# Patient Record
Sex: FEMALE | Race: WHITE
[De-identification: names, ages, dates, MRNs, and addresses within clinical notes are randomized per-mention and may not be internally consistent; named-entity substitution may affect disease eponyms.]

---

## 2017-04-16 ENCOUNTER — HOSPITAL ENCOUNTER (EMERGENCY)
Dept: HOSPITAL 74 - JER | Age: 37
Discharge: HOME | End: 2017-04-16
Payer: SELF-PAY

## 2017-04-16 VITALS — SYSTOLIC BLOOD PRESSURE: 104 MMHG | HEART RATE: 76 BPM | DIASTOLIC BLOOD PRESSURE: 62 MMHG

## 2017-04-16 VITALS — TEMPERATURE: 98.6 F

## 2017-04-16 VITALS — BODY MASS INDEX: 32.6 KG/M2

## 2017-04-16 DIAGNOSIS — R06.02: ICD-10-CM

## 2017-04-16 DIAGNOSIS — R00.2: Primary | ICD-10-CM

## 2017-04-16 LAB
ALBUMIN SERPL-MCNC: 3.9 G/DL (ref 3.4–5)
ALP SERPL-CCNC: 88 U/L (ref 45–117)
ALT SERPL-CCNC: 46 U/L (ref 12–78)
ANION GAP SERPL CALC-SCNC: 9 MMOL/L (ref 8–16)
APPEARANCE UR: (no result)
AST SERPL-CCNC: 21 U/L (ref 15–37)
BASOPHILS # BLD: 0.6 % (ref 0–2)
BILIRUB SERPL-MCNC: 0.3 MG/DL (ref 0.2–1)
BILIRUB UR STRIP.AUTO-MCNC: NEGATIVE MG/DL
CALCIUM SERPL-MCNC: 9 MG/DL (ref 8.5–10.1)
CO2 SERPL-SCNC: 27 MMOL/L (ref 21–32)
COLOR UR: (no result)
CREAT SERPL-MCNC: 0.8 MG/DL (ref 0.55–1.02)
D DIMER PPP FEU-MCNC: 301 NG/ML
DEPRECATED RDW RBC AUTO: 13.1 % (ref 11.6–15.6)
EOSINOPHIL # BLD: 3.4 % (ref 0–4.5)
GLUCOSE SERPL-MCNC: 98 MG/DL (ref 74–106)
INR BLD: 1.02 (ref 0.82–1.09)
KETONES UR QL STRIP: NEGATIVE
LEUKOCYTE ESTERASE UR QL STRIP.AUTO: (no result)
MCH RBC QN AUTO: 30 PG (ref 25.7–33.7)
MCHC RBC AUTO-ENTMCNC: 34.5 G/DL (ref 32–36)
MCV RBC: 87 FL (ref 80–96)
MUCOUS THREADS URNS QL MICRO: (no result)
NEUTROPHILS # BLD: 60.6 % (ref 42.8–82.8)
NITRITE UR QL STRIP: NEGATIVE
PH UR: 5 [PH] (ref 5–8)
PLATELET # BLD AUTO: 302 K/MM3 (ref 134–434)
PMV BLD: 7.8 FL (ref 7.5–11.1)
PROT SERPL-MCNC: 7.6 G/DL (ref 6.4–8.2)
PROT UR QL STRIP: (no result)
PROT UR QL STRIP: NEGATIVE
PT PNL PPP: 11.2 SEC (ref 9.98–11.88)
RBC # BLD AUTO: 5493 /HPF (ref 0–3)
RBC # UR STRIP: (no result) /UL
SP GR UR: 1.02 (ref 1–1.03)
TROPONIN I SERPL-MCNC: < 0.02 NG/ML (ref 0–0.05)
UROBILINOGEN UR STRIP-MCNC: NEGATIVE E.U./DL (ref 0.2–1)
WBC # BLD AUTO: 10.3 K/MM3 (ref 4–10)
WBC # UR AUTO: (no result) /HPF (ref 3–5)

## 2017-04-16 NOTE — PDOC
History of Present Illness





<Shar Frost - Last Filed: 04/16/17 14:11>





- General


History Source: Patient


Exam Limitations: No Limitations





- History of Present Illness


Initial Comments: 


04/16/17 12:07


The patient is a 36 year old female, LMP now, with no significant past medical 

history, who presents to the ER with intermittent chest pain, heart palpitations

, and shortness of breath for two months. Patient describes the chest pain as a 

squeezing sensation, localized to the mid chest region. She attributed her 

symptoms to anxiety after the passing of her father one month ago. Patient says 

the symptoms come and go at unexpected times, but improve when she is busy 

doing housework. Patient reports coming to the ER today because the chest pain 

and shortness of breath were worse last night and she was not able to sleep 

well. She also says she had a day off today and was able to come to the ER. 

Patient denies going to a PCP or cardiologist for her symptoms. 





Denies alcohol use or smoking


Denies nausea, vomiting


Denies lightheadedness, diaphoresis





FHx: Father passed away 1 month ago due to lung cancer s/p chain smoking








<Lorne,Maribell - Last Filed: 04/16/17 14:32>





- General


Chief Complaint: Chest Pain


Stated Complaint: CHEST PAIN


Time Seen by Provider: 04/16/17 11:36





Past History





- Past Medical History


Other medical history: NONE





- Psycho/Social/Smoking Cessation Hx


Anxiety: No


Suicidal Ideation: No


Smoking History: Never smoked


Hx Alcohol Use: No


Drug/Substance Use Hx: No


Substance Use Type: None





<Shar Frost - Last Filed: 04/16/17 14:11>





<Maribell Pradhan - Last Filed: 04/16/17 14:32>





- Past Medical History


Allergies/Adverse Reactions: 


 Allergies











Allergy/AdvReac Type Severity Reaction Status Date / Time


 


No Known Allergies Allergy   Verified 04/16/17 11:25











Home Medications: 


Ambulatory Orders





NK [No Known Home Medication]  04/16/17 











**Review of Systems





- Review of Systems


Able to Perform ROS?: Yes


Comments:: 





04/16/17 12:09


GENERAL/CONSTITUTIONAL: No fever or chills. No weakness.


HEAD, EYES, EARS, NOSE AND THROAT: No change in vision. No ear pain or 

discharge. No sore throat.


CARDIOVASCULAR: Chest pain, SOB, heart palpitations


RESPIRATORY: No cough, wheezing, or hemoptysis.


GASTROINTESTINAL: No nausea, vomiting, diarrhea or constipation.


GENITOURINARY: No dysuria, frequency, or change in urination.


MUSCULOSKELETAL: No joint or muscle swelling or pain. No neck or back pain.


SKIN: No rash


NEUROLOGIC: No headache, vertigo, loss of consciousness, or change in strength/

sensation.


ENDOCRINE: No increased thirst. No abnormal weight change.


HEMATOLOGIC/LYMPHATIC: No anemia, easy bleeding, or history of blood clots.


ALLERGIC/IMMUNOLOGIC: No hives or skin allergy.





<Uts,Maribell - Last Filed: 04/16/17 14:32>





*Physical Exam





- Vital Signs


 Last Vital Signs











Temp Pulse Resp BP Pulse Ox


 


 98.6 F   84   20   120/73   99 


 


 04/16/17 11:22  04/16/17 11:22  04/16/17 11:22  04/16/17 11:22  04/16/17 11:22














<Shar Frost - Last Filed: 04/16/17 14:11>





- Vital Signs


 Last Vital Signs











Temp Pulse Resp BP Pulse Ox


 


 98.6 F   84   20   120/73   99 


 


 04/16/17 11:22  04/16/17 11:22  04/16/17 11:22  04/16/17 11:22  04/16/17 11:22














- Physical Exam


Comments: 





04/16/17 12:10


GENERAL: Awake, alert, and fully oriented, in no acute distress


HEAD: No signs of trauma


EYES: PERRLA, EOMI, sclera anicteric, conjunctiva clear


ENT: Auricles normal inspection, hearing grossly normal, nares patent, 

oropharynx clear without exudates. Moist mucosa


NECK: Normal ROM, supple, no lymphadenopathy, JVD, or masses


LUNGS: Breath sounds equal, clear to auscultation bilaterally.  No wheezes, and 

no crackles


HEART: Regular rate and rhythm, normal S1 and S2, no murmurs, rubs or gallops


ABDOMEN: Soft, nontender, normoactive bowel sounds.  No guarding, no rebound.  

No masses


EXTREMITIES: Normal range of motion, no edema.  No clubbing or cyanosis. No 

cords, erythema, or tenderness


NEUROLOGICAL: Cranial nerves II through XII grossly intact.  Normal speech, 

normal gait


SKIN: Warm, Dry, normal turgor, no rashes or lesions noted.








<Maribell Pradhan - Last Filed: 04/16/17 14:32>





ED Treatment Course





- LABORATORY


CBC & Chemistry Diagram: 


 04/16/17 11:46





 04/16/17 11:46





<Shar Frost - Last Filed: 04/16/17 14:11>





- LABORATORY


CBC & Chemistry Diagram: 


 04/16/17 11:46





 04/16/17 11:46





- RADIOLOGY


Radiograph Interpretation: 





04/16/17 14:31


Chest XR impression reported by Dr. Nolberto Rosen: No evidence of acute 

pulmonary emboli 





<Maribell Pradhan - Last Filed: 04/16/17 14:32>





Medical Decision Making





- Medical Decision Making


04/16/17 12:11


The patient is a 36 year old female who presents to the ER with SOB, chest pain

, and heart palpitations for a couple of months. 





Will check labs and chest CT 


Then reassess











<Maribell Pradhan - Last Filed: 04/16/17 14:32>





*DC/Admit/Observation/Transfer





- Discharge Dispostion


Admit: No





- Attestations


Physician Attestion: 





04/16/17 11:36








I, Dr. Shar Frost, attest that this document has been prepared under my 

direction and personally reviewed by me in its entirety.   I further attest, 

that it accurately reflects all work, treatment, procedures and medical decision

-making performed by me.  





<Shar Frost - Last Filed: 04/16/17 14:11>





- Attestations


Scribe Attestion: 





04/16/17 12:15





Documentation prepared by Maribell Pradhan, acting as medical scribe for Shar Frost DO.





<Maribell Pradhan - Last Filed: 04/16/17 14:32>


Diagnosis at time of Disposition: 


 Intermittent palpitations





Dyspnea


Qualifiers:


 Dyspnea type: shortness of breath Qualified Code(s): R06.02 - Shortness of 

breath





- Discharge Dispostion


Disposition: HOME


Condition at time of disposition: Good





- Referrals


Referrals: 


Zheng Dumont MD [Staff Physician] - 





- Patient Instructions


Printed Discharge Instructions:  DI for Atypical Chest Pain


Additional Instructions: 


May-  





Please follow up with the cardiologist as soon as possible and return to us if 

you get worse or develop any new symptoms.  No medicines for now.  I am sorry 

about your dad.





Best-


Dr. Shar Frost

## 2018-12-30 ENCOUNTER — HOSPITAL ENCOUNTER (EMERGENCY)
Dept: HOSPITAL 74 - JERFT | Age: 38
Discharge: HOME | End: 2018-12-30
Payer: COMMERCIAL

## 2018-12-30 VITALS — DIASTOLIC BLOOD PRESSURE: 73 MMHG | SYSTOLIC BLOOD PRESSURE: 125 MMHG | HEART RATE: 86 BPM | TEMPERATURE: 98.6 F

## 2018-12-30 VITALS — BODY MASS INDEX: 0.3 KG/M2
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
## 2018-12-30 DIAGNOSIS — R07.89: Primary | ICD-10-CM
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
## 2018-12-30 LAB
ALBUMIN SERPL-MCNC: 4 G/DL (ref 3.4–5)
ALP SERPL-CCNC: 84 U/L (ref 45–117)
ALT SERPL-CCNC: 34 U/L (ref 13–61)
ANION GAP SERPL CALC-SCNC: 9 MMOL/L (ref 8–16)
APTT BLD: 32.9 SECONDS (ref 25.2–36.5)
AST SERPL-CCNC: 30 U/L (ref 15–37)
BILIRUB SERPL-MCNC: 0.4 MG/DL (ref 0.2–1)
BUN SERPL-MCNC: 14 MG/DL (ref 7–18)
CALCIUM SERPL-MCNC: 9 MG/DL (ref 8.5–10.1)
CHLORIDE SERPL-SCNC: 105 MMOL/L (ref 98–107)
CO2 SERPL-SCNC: 23 MMOL/L (ref 21–32)
CREAT SERPL-MCNC: 0.8 MG/DL (ref 0.55–1.3)
DEPRECATED RDW RBC AUTO: 12.6 % (ref 11.6–15.6)
GLUCOSE SERPL-MCNC: 128 MG/DL (ref 74–106)
HCT VFR BLD CALC: 41.6 % (ref 32.4–45.2)
HGB BLD-MCNC: 13.9 GM/DL (ref 10.7–15.3)
INR BLD: 0.95 (ref 0.83–1.09)
MCH RBC QN AUTO: 29.2 PG (ref 25.7–33.7)
MCHC RBC AUTO-ENTMCNC: 33.3 G/DL (ref 32–36)
MCV RBC: 87.8 FL (ref 80–96)
PLATELET # BLD AUTO: 289 K/MM3 (ref 134–434)
PMV BLD: 7.8 FL (ref 7.5–11.1)
POTASSIUM SERPLBLD-SCNC: 4.4 MMOL/L (ref 3.5–5.1)
PROT SERPL-MCNC: 7.9 G/DL (ref 6.4–8.2)
PT PNL PPP: 11.2 SEC (ref 9.7–13)
RBC # BLD AUTO: 4.74 M/MM3 (ref 3.6–5.2)
SODIUM SERPL-SCNC: 137 MMOL/L (ref 136–145)
WBC # BLD AUTO: 12.7 K/MM3 (ref 4–10)

## 2018-12-30 PROCEDURE — 3E0333Z INTRODUCTION OF ANTI-INFLAMMATORY INTO PERIPHERAL VEIN, PERCUTANEOUS APPROACH: ICD-10-PCS

## 2018-12-30 RX ADMIN — ACETAMINOPHEN ONE MG: 325 TABLET ORAL at 17:12

## 2018-12-30 RX ADMIN — ACETAMINOPHEN ONE: 325 TABLET ORAL at 17:19

## 2018-12-30 NOTE — PDOC
History of Present Illness





- General


Chief Complaint: Chest Pain


Stated Complaint: CHEST PAIN


Time Seen by Provider: 12/30/18 16:50


History Source: Patient


Exam Limitations: No Limitations





- History of Present Illness


Initial Comments: 





12/30/18 17:00


38 yr female with no pmhx presents to ER with left sided chest pain started 

last night worse the past 2 hrs today. Pt denies nausea or vomiting, pain is 

non radiating. 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 

heavy. Pt took 1000mg acetaminophen PTA. no fever no cough. 


12/30/18 17:17





Presenting Symptoms: Chest Pain


Timing/Duration: reports: constant, getting worse


Severity/Quality: reports: moderate, sharp, stabbing





Past History





- Past Medical History


Allergies/Adverse Reactions: 


 Allergies











Allergy/AdvReac Type Severity Reaction Status Date / Time


 


No Known Allergies Allergy   Verified 12/30/18 16:36











Home Medications: 


Ambulatory Orders





Naproxen [Naprosyn -] 500 mg PO BID PRN #14 tablet 12/30/18 








COPD: No





- Suicide/Smoking/Psychosocial Hx


Smoking History: Never smoked


Hx Alcohol Use: No


Drug/Substance Use Hx: No


Substance Use Type: None





Cardiac Specific PMH





- Complaint Specific PMHX


Abdominal Aortic Aneurysm: No


Angina: No


Cardiac Arrhythmia: No


Cardiac Stent: No


GERD: No


Myocardial Infarction: No


Pacemaker: No


Pulmonary Embolus: No


Valvular Heart Disease: No


Peripheral Vascular Disease: No





*Physical Exam





- Vital Signs


 Last Vital Signs











Temp Pulse Resp BP Pulse Ox


 


 98.6 F   86   16   125/73   99 


 


 12/30/18 16:37  12/30/18 16:37  12/30/18 16:37  12/30/18 16:37  12/30/18 16:37














- Physical Exam


General Appearance: Yes: Nourished, Appropriately Dressed


HEENT: positive: EOMI, SHIRA, Normal ENT Inspection, TMs Normal, Pharynx Normal


Neck: positive: Supple.  negative: Tender


Respiratory/Chest: positive: Lungs Clear, Normal Breath Sounds.  negative: 

Chest Tender, Crackles, Rales, Rhonchi, Stridor, Wheezing


Cardiovascular: positive: Regular Rhythm, Regular Rate


Gastrointestinal/Abdominal: positive: Normal Bowel Sounds, Soft


Lymphatic: negative: Adenopathy


Musculoskeletal: positive: Normal Inspection


Extremity: positive: Normal Capillary Refill, Normal Inspection, Normal Range 

of Motion


Integumentary: positive: Normal Color, Dry, Warm


Neurologic: positive: Fully Oriented, Alert, Normal Mood/Affect, Normal Response

, Motor Strength 5/5, Finger to Nose (intact).  negative: Numbness, Sensory 

Deficit





**Heart Score/ECG Review





- ECG Impressions


Normal ECG: Yes


Non-specific ST Elevation: No


Ischemic Changes: No


Comment:: 





12/30/18 17:05


signed by  ER 





Moderate Sedation





- Procedure Monitoring


Vital Signs: 


Procedure Monitoring Vital Signs











Temperature  98.6 F   12/30/18 16:37


 


Pulse Rate  86   12/30/18 16:37


 


Respiratory Rate  16   12/30/18 16:37


 


Blood Pressure  125/73   12/30/18 16:37


 


O2 Sat by Pulse Oximetry (%)  99   12/30/18 16:37











ED Treatment Course





- LABORATORY


CBC & Chemistry Diagram: 


 12/30/18 17:00





 12/30/18 17:00





- ADDITIONAL ORDERS


Additional order review: 


 Laboratory  Results











  12/30/18 12/30/18 12/30/18





  17:00 17:00 17:00


 


PT with INR    Cancelled


 


INR    Cancelled


 


PTT (Actin FS)    Cancelled


 


D-Dimer  Cancelled  


 


Sodium   137 


 


Potassium   4.4 


 


Chloride   105 


 


Carbon Dioxide   23 


 


Anion Gap   9 


 


BUN   14 


 


Creatinine   0.8 


 


Creat Clearance w eGFR   > 60 


 


Random Glucose   128 H 


 


Calcium   9.0 


 


Total Bilirubin   0.4 


 


AST   30 


 


ALT   34 


 


Alkaline Phosphatase   84 


 


Creatine Kinase   131 


 


Troponin I   < 0.02 


 


Total Protein   7.9 


 


Albumin   4.0 


 


Urine HCG, Qual   














  12/30/18





  16:48


 


PT with INR 


 


INR 


 


PTT (Actin FS) 


 


D-Dimer 


 


Sodium 


 


Potassium 


 


Chloride 


 


Carbon Dioxide 


 


Anion Gap 


 


BUN 


 


Creatinine 


 


Creat Clearance w eGFR 


 


Random Glucose 


 


Calcium 


 


Total Bilirubin 


 


AST 


 


ALT 


 


Alkaline Phosphatase 


 


Creatine Kinase 


 


Troponin I 


 


Total Protein 


 


Albumin 


 


Urine HCG, Qual  Negative








 











  12/30/18





  17:00


 


RBC  4.74


 


MCV  87.8


 


MCHC  33.3


 


RDW  12.6


 


MPV  7.8














- RADIOLOGY


Radiology Studies Ordered: 














 Category Date Time Status


 


 CHEST PA & LAT [RAD] Stat Radiology  12/30/18 17:48 Taken














- Medications


Given in the ED: 


ED Medications














Discontinued Medications














Generic Name Dose Route Start Last Admin





  Trade Name Freq  PRN Reason Stop Dose Admin


 


Acetaminophen  650 mg  12/30/18 17:06  12/30/18 17:19





  Tylenol -  PO  12/30/18 17:07  Not Given





  ONCE ONE   





     





     





     





     


 


Ketorolac Tromethamine  30 mg  12/30/18 17:09  12/30/18 17:19





  Toradol Injection -  IVPUSH  12/30/18 17:10  30 mg





  ONCE ONE   Administration





     





     





     





     














Medical Decision Making





- Medical Decision Making





12/30/18 17:11


cc: lscp started last night is constant worse today, took 1000mg acetaminophen 

pta. 


pain is reproducable with movement 


non tender to palpation, no rashes no sob or nausea. 


pt believes it may be related to lifting heavy boxes at work


will check labs, EKG, pain meds 


r/o ACS, PE, chest wall pain


vitals stable





EKG is NSR no ectopy no tachycardia 


12/30/18 17:17





12/30/18 18:06


pt improved after the toradol states pain is 1/10 


labs are WNL EKG NSR


CX is negative


dc home with naprosyn follow up with clinic tomorrow





*DC/Admit/Observation/Transfer


Diagnosis at time of Disposition: 


 Musculoskeletal chest pain








- Discharge Dispostion


Disposition: HOME


Condition at time of disposition: Good





- Prescriptions


Prescriptions: 


Naproxen [Naprosyn -] 500 mg PO BID PRN #14 tablet


 PRN Reason: Pain





- Referrals


Referrals: 


[facility] [place] [Provider Group]


[facility] [Provider Group]





- Patient Instructions


Additional Instructions: 


take naprosyn for pain as directed


apply heating pad to the area of pain every 4hrs for 30 minutes while awake


avoid heavy lifting or bending 


follow up at the clinic tomorrow or tuesday for follow up call the number below 

tell them you were seen in the Emergency department for chest pain and you need 

a follow up visit. 


Return to ER for any worsening symptoms 





pelon naprosyn para el dolor segn las indicaciones


aplique raz almohadilla trmica al pedro pablo del dolor cada 4 horas winston 30 

minutos mientras est despierto


evitar levantar objetos pesados o doblarse


maegan un seguimiento en la clnica maana o lul para el seguimiento. Llame al 

nmero que se encuentra a continuacin para informarles que lo atendieron en el 

Departamento de Emergencias por dolor de pecho y que necesita raz visita de 

seguimiento.


Volver a la taryn de emergencias por cualquier empeoramiento de los sntomas





- Post Discharge Activity


Forms/Work/School Notes:  Back to Work

## 2018-12-31 NOTE — EKG
Test Reason : 

Blood Pressure : ***/*** mmHG

Vent. Rate : 087 BPM     Atrial Rate : 087 BPM

   P-R Int : 156 ms          QRS Dur : 082 ms

    QT Int : 382 ms       P-R-T Axes : 056 055 044 degrees

   QTc Int : 459 ms

 

NORMAL SINUS RHYTHM

NORMAL ECG

WHEN COMPARED WITH ECG OF 16-APR-2017 11:29,

NO SIGNIFICANT CHANGE WAS FOUND

Confirmed by NAS FRIAS MD ([id number]) on 12/31/2018 12:18:15 PM

 

Referred By:             Confirmed By:NAS FRIAS MD

## 2021-08-26 ENCOUNTER — HOSPITAL ENCOUNTER (EMERGENCY)
Dept: HOSPITAL 74 - JER | Age: 41
LOS: 1 days | Discharge: HOME | End: 2021-08-27
Payer: COMMERCIAL

## 2021-08-26 VITALS — BODY MASS INDEX: 34 KG/M2

## 2021-08-26 DIAGNOSIS — R10.9: Primary | ICD-10-CM

## 2021-08-26 PROCEDURE — U0005 INFEC AGEN DETEC AMPLI PROBE: HCPCS

## 2021-08-26 PROCEDURE — C9803 HOPD COVID-19 SPEC COLLECT: HCPCS

## 2021-08-26 PROCEDURE — U0003 INFECTIOUS AGENT DETECTION BY NUCLEIC ACID (DNA OR RNA); SEVERE ACUTE RESPIRATORY SYNDROME CORONAVIRUS 2 (SARS-COV-2) (CORONAVIRUS DISEASE [COVID-19]), AMPLIFIED PROBE TECHNIQUE, MAKING USE OF HIGH THROUGHPUT TECHNOLOGIES AS DESCRIBED BY CMS-2020-01-R: HCPCS

## 2021-08-27 VITALS — SYSTOLIC BLOOD PRESSURE: 146 MMHG | DIASTOLIC BLOOD PRESSURE: 88 MMHG

## 2021-08-27 VITALS — TEMPERATURE: 98.5 F | HEART RATE: 85 BPM

## 2021-08-27 LAB
ALBUMIN SERPL-MCNC: 3.6 G/DL (ref 3.4–5)
ALBUMIN SERPL-MCNC: 4.1 G/DL (ref 3.4–5)
ALP SERPL-CCNC: 73 U/L (ref 45–117)
ALP SERPL-CCNC: 83 U/L (ref 45–117)
ALT SERPL-CCNC: 172 U/L (ref 13–61)
ALT SERPL-CCNC: 210 U/L (ref 13–61)
ANION GAP SERPL CALC-SCNC: 6 MMOL/L (ref 8–16)
ANION GAP SERPL CALC-SCNC: 8 MMOL/L (ref 8–16)
APPEARANCE UR: CLEAR
AST SERPL-CCNC: 117 U/L (ref 15–37)
AST SERPL-CCNC: 90 U/L (ref 15–37)
BASOPHILS # BLD: 0.5 % (ref 0–2)
BILIRUB SERPL-MCNC: 0.2 MG/DL (ref 0.2–1)
BILIRUB SERPL-MCNC: 0.3 MG/DL (ref 0.2–1)
BILIRUB UR STRIP.AUTO-MCNC: NEGATIVE MG/DL
BUN SERPL-MCNC: 6.3 MG/DL (ref 7–18)
BUN SERPL-MCNC: 7.3 MG/DL (ref 7–18)
CALCIUM SERPL-MCNC: 7.8 MG/DL (ref 8.5–10.1)
CALCIUM SERPL-MCNC: 8.7 MG/DL (ref 8.5–10.1)
CHLORIDE SERPL-SCNC: 108 MMOL/L (ref 98–107)
CHLORIDE SERPL-SCNC: 112 MMOL/L (ref 98–107)
CO2 SERPL-SCNC: 23 MMOL/L (ref 21–32)
CO2 SERPL-SCNC: 23 MMOL/L (ref 21–32)
COLOR UR: YELLOW
CREAT SERPL-MCNC: 0.6 MG/DL (ref 0.55–1.3)
CREAT SERPL-MCNC: 0.7 MG/DL (ref 0.55–1.3)
DEPRECATED RDW RBC AUTO: 13.1 % (ref 11.6–15.6)
EOSINOPHIL # BLD: 0.1 % (ref 0–4.5)
GLUCOSE SERPL-MCNC: 103 MG/DL (ref 74–106)
GLUCOSE SERPL-MCNC: 108 MG/DL (ref 74–106)
HCT VFR BLD CALC: 40 % (ref 32.4–45.2)
HGB BLD-MCNC: 13.6 GM/DL (ref 10.7–15.3)
KETONES UR QL STRIP: (no result)
LEUKOCYTE ESTERASE UR QL STRIP.AUTO: NEGATIVE
LIPASE SERPL-CCNC: 173 U/L (ref 73–393)
LYMPHOCYTES # BLD: 15.3 % (ref 8–40)
MCH RBC QN AUTO: 29.3 PG (ref 25.7–33.7)
MCHC RBC AUTO-ENTMCNC: 34 G/DL (ref 32–36)
MCV RBC: 86.2 FL (ref 80–96)
MONOCYTES # BLD AUTO: 8.3 % (ref 3.8–10.2)
NEUTROPHILS # BLD: 75.8 % (ref 42.8–82.8)
NITRITE UR QL STRIP: NEGATIVE
PH UR: 7.5 [PH] (ref 5–8)
PLATELET # BLD AUTO: 168 10^3/UL (ref 134–434)
PMV BLD: 8.3 FL (ref 7.5–11.1)
PROT SERPL-MCNC: 6.9 G/DL (ref 6.4–8.2)
PROT SERPL-MCNC: 8.2 G/DL (ref 6.4–8.2)
PROT UR QL STRIP: NEGATIVE
PROT UR QL STRIP: NEGATIVE
RBC # BLD AUTO: 4.64 M/MM3 (ref 3.6–5.2)
SODIUM SERPL-SCNC: 139 MMOL/L (ref 136–145)
SODIUM SERPL-SCNC: 140 MMOL/L (ref 136–145)
SP GR UR: 1.01 (ref 1.01–1.03)
UROBILINOGEN UR STRIP-MCNC: 0.2 MG/DL (ref 0.2–1)
WBC # BLD AUTO: 5.5 K/MM3 (ref 4–10)

## 2021-08-27 PROCEDURE — 3E033NZ INTRODUCTION OF ANALGESICS, HYPNOTICS, SEDATIVES INTO PERIPHERAL VEIN, PERCUTANEOUS APPROACH: ICD-10-PCS

## 2021-08-27 PROCEDURE — 3E033GC INTRODUCTION OF OTHER THERAPEUTIC SUBSTANCE INTO PERIPHERAL VEIN, PERCUTANEOUS APPROACH: ICD-10-PCS
